# Patient Record
Sex: FEMALE | Race: WHITE
[De-identification: names, ages, dates, MRNs, and addresses within clinical notes are randomized per-mention and may not be internally consistent; named-entity substitution may affect disease eponyms.]

---

## 2020-04-14 ENCOUNTER — HOSPITAL ENCOUNTER (INPATIENT)
Dept: HOSPITAL 1 - ED | Age: 60
LOS: 3 days | Discharge: HOME | DRG: 432 | End: 2020-04-17
Attending: FAMILY MEDICINE | Admitting: FAMILY MEDICINE
Payer: SELF-PAY

## 2020-04-14 VITALS
WEIGHT: 168.12 LBS | HEIGHT: 64 IN | WEIGHT: 168.12 LBS | BODY MASS INDEX: 28.7 KG/M2 | HEIGHT: 64 IN | BODY MASS INDEX: 28.7 KG/M2

## 2020-04-14 VITALS — SYSTOLIC BLOOD PRESSURE: 130 MMHG | DIASTOLIC BLOOD PRESSURE: 75 MMHG

## 2020-04-14 DIAGNOSIS — K70.40: Primary | ICD-10-CM

## 2020-04-14 DIAGNOSIS — K86.1: ICD-10-CM

## 2020-04-14 DIAGNOSIS — N17.0: ICD-10-CM

## 2020-04-14 DIAGNOSIS — E87.6: ICD-10-CM
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
## 2020-04-14 DIAGNOSIS — Z90.49: ICD-10-CM

## 2020-04-14 DIAGNOSIS — E87.1: ICD-10-CM

## 2020-04-14 DIAGNOSIS — F10.20: ICD-10-CM

## 2020-04-14 DIAGNOSIS — N39.0: ICD-10-CM

## 2020-04-14 DIAGNOSIS — K70.30: ICD-10-CM

## 2020-04-14 LAB
ALBUMIN SERPL-MCNC: 1.7 G/DL (ref 3.4–5)
ALP SERPL-CCNC: 158 U/L (ref 46–116)
ALT SERPL-CCNC: 121 U/L (ref 14–59)
AST SERPL-CCNC: 359 U/L (ref 15–37)
BASOPHILS NFR BLD: 0.1 % (ref 0–2)
BILIRUB SERPL-MCNC: 27.4 MG/DL (ref 0.2–1)
BUN SERPL-MCNC: 9 MG/DL (ref 7–18)
CALCIUM SERPL-MCNC: 8.8 MG/DL (ref 8.5–10.1)
CHLORIDE SERPL-SCNC: 96 MMOL/L (ref 98–107)
CO2 SERPL-SCNC: 28.1 MMOL/L (ref 21–32)
CREAT SERPL-MCNC: 1.1 MG/DL (ref 0.6–1)
ERYTHROCYTE [DISTWIDTH] IN BLOOD BY AUTOMATED COUNT: 20 % (ref 11.5–14.5)
GFR SERPLBLD BASED ON 1.73 SQ M-ARVRAT: 54 ML/MIN
GLUCOSE SERPL-MCNC: 135 MG/DL (ref 74–106)
MICROSCOPIC UR-IMP: YES
PLATELET # BLD: 269 X10^3MCL (ref 130–400)
POTASSIUM SERPL-SCNC: 2.9 MMOL/L (ref 3.5–5.1)
PROT SERPL-MCNC: 7.9 G/DL (ref 6.4–8.2)
RBC # UR STRIP.AUTO: NEGATIVE /UL
SODIUM SERPL-SCNC: 133 MMOL/L (ref 136–145)
UA SPECIFIC GRAVITY: 1.01 (ref 1–1.03)

## 2020-04-14 PROCEDURE — G0378 HOSPITAL OBSERVATION PER HR: HCPCS

## 2020-04-14 NOTE — NUR
RECEIVED PT FROM ED VIA GURNEY WEARING A MASK. CAME IN FOR BODY ACHES AND
WEAKNESS X2 MONTHS. AOX4, C/O HA. ABLE TO FOLLOW COMMANDS. NO SOB NOTED. PT
C/O DRY COUGH. DENIES ANY CHEST PAIN, NSR ON THE MONITOR. DENIES ABD
DISCOMFORT. LAST BM 4/13/20. VOIDS FREELY. JAUNDICED. NO EDEMA NOTED. IV SITE
ON RAC PATENT AND INTACT. POTASSIUM CHLORIDE INFUSING AT 30ML/HR. PADDED SIDE
RAILS UP X2. CALL LIGHT WITHIN REACH. BED IN LOWEST POSITION. ENDORSE CARE TO
[place]ITO.

## 2020-04-14 NOTE — NUR
RECEIVED PT FROM PREVIOUS SHIFT NURSE. PT AOX2, NONVERBAL. MED SURG PT, DENIES
CP/PRESSURE. NO S/S SOB/DIFFICULTY BREATHING, ON 2L NC. IV TO RFA, INTACT AND
PATENT. BED IN LOWEST POSITION. CALL LIGHT WITHIN REACH. WILL CONTINUE TO
MONITOR.

## 2020-04-14 NOTE — NUR
PT BIB SELF, C/O BODYACHES AND WEAKNESS X2 MONTHS WITH DRY COUGH X2 MONTHS ON
AND OFF. PT DENIES ANY FEVER, SOB, TRAVELING OR BEING AROUND ANYONE SICK. PT
STATES 2 MONTHS AGO SHE ALSO HAD N/V. PT DENIES ANY N/V AT THIS TIME. PT
ACTIVELY COUGHING. PT WEARING MASK. PT PLACED ON GOWN, CARDIAC MONITOR, PULSE
OX. PT SKIN AND EYES JAUNDICE FROM CHRONIC MEDICAL CONDITION. PT AAOX4, NO
DISTRESS NOTED, RESPIRATIONS E/U, CLEAR UPPER BILATERAL AND LEFT BASE LUNGS 
SOUNDS WITH RIGHT MID/LWER LOBE DIMINISGED BREATH SOUNDS UPON AUSCULTATION.
PT AMBULATORY WITH SLOW STEADY GAIT TO BATHROOM AND BACK TO EXAM ROOM. CALL
LIGHT WITHIN REACH. AWATING FOR MSE. WILL CONTINUE TO  MONITOR.

## 2020-04-14 NOTE — NUR
RECEIVED PT FROM RAJEEV KATZ. PT IN NO ACUTE DISTRESS. CALL LIGHT WITHIN REACH.
BED IN LOWEST POSITION.

## 2020-04-15 VITALS — SYSTOLIC BLOOD PRESSURE: 108 MMHG | DIASTOLIC BLOOD PRESSURE: 78 MMHG

## 2020-04-15 VITALS — DIASTOLIC BLOOD PRESSURE: 56 MMHG | SYSTOLIC BLOOD PRESSURE: 103 MMHG

## 2020-04-15 VITALS — DIASTOLIC BLOOD PRESSURE: 59 MMHG | SYSTOLIC BLOOD PRESSURE: 104 MMHG

## 2020-04-15 VITALS — DIASTOLIC BLOOD PRESSURE: 66 MMHG | SYSTOLIC BLOOD PRESSURE: 116 MMHG

## 2020-04-15 VITALS — DIASTOLIC BLOOD PRESSURE: 66 MMHG | SYSTOLIC BLOOD PRESSURE: 111 MMHG

## 2020-04-15 LAB
ALBUMIN SERPL-MCNC: 1.4 G/DL (ref 3.4–5)
ALP SERPL-CCNC: 136 U/L (ref 46–116)
ALT SERPL-CCNC: 100 U/L (ref 14–59)
AMPHETAMINES UR QL SCN: (no result)
AST SERPL-CCNC: 293 U/L (ref 15–37)
BASOPHILS NFR BLD: 0.5 % (ref 0–2)
BILIRUB SERPL-MCNC: 21.8 MG/DL (ref 0.2–1)
BUN SERPL-MCNC: 8 MG/DL (ref 7–18)
CALCIUM SERPL-MCNC: 8.2 MG/DL (ref 8.5–10.1)
CHLORIDE SERPL-SCNC: 101 MMOL/L (ref 98–107)
CO2 SERPL-SCNC: 24.7 MMOL/L (ref 21–32)
CREAT SERPL-MCNC: 0.9 MG/DL (ref 0.6–1)
ERYTHROCYTE [DISTWIDTH] IN BLOOD BY AUTOMATED COUNT: 19.9 % (ref 11.5–14.5)
GFR SERPLBLD BASED ON 1.73 SQ M-ARVRAT: > 60 ML/MIN
GLUCOSE SERPL-MCNC: 107 MG/DL (ref 74–106)
MAGNESIUM SERPL-MCNC: 2.4 MG/DL (ref 1.8–2.4)
PHOSPHATE SERPL-MCNC: 2.7 MG/DL (ref 2.5–4.9)
PLATELET # BLD: 205 X10^3MCL (ref 130–400)
POTASSIUM SERPL-SCNC: 2.5 MMOL/L (ref 3.5–5.1)
PROT SERPL-MCNC: 7.3 G/DL (ref 6.4–8.2)
SODIUM SERPL-SCNC: 137 MMOL/L (ref 136–145)

## 2020-04-15 NOTE — NUR
RECIEVED PT RESTING IN BED WITH NO DISTRESS NOTED. A/OX4 WITH NO HA OR
DIZZINESS. SEIZURE PRECAUTIONS IN PLACE. JAUNDICE NOTED TO MD MAXIME AWARE.
TELE#2 CONNECTED TO PT, DENIES CP OR PRESSURE. LUNGS CTAB WITH NO SOB NOTED.
INTERMITTENT DRY COUGH REPORTED BY NOC. NS 41.6 ML/HR RUNNING IN RAC, CDI AND
PATENT. SAFETY PRECAUTIONS IN PLACE, CALL LIGHT NOTED WILL MONITOR.

## 2020-04-15 NOTE — NUR
PT RESTING IN BED WITH NO DISTRESS NOTED. A/OX4 WITH NO HA OR DIZZINESS.
SEIZURE PRECAUTIONS IN PLACE. JAUNDICE NOTED TO MD MAXIME AWARE. TELE #2
CONNECTED TO PT, DENIES CP OR PRESSURE. LUNGS CTAB WITH NO SOB NOTED.
D5/NS/KCL-20MEQ RUNNING AT 80ML/HR IN RAC, CDI AND PATENT. SAFETY PRECAUTIONS
IN PLACE, CALL LIGHT WITHIN REACH, WILL ENDORSE CARE TO NOC SHIFT.

## 2020-04-15 NOTE — NUR
PT RESTING IN BED. DENIES ANY PAIN/DISCOMFORT AT THIS TIME. BED IN LOWEST
POSITION. CALL LIGHT WITHIN REACH. WILL CONTINUE TO MONITOR.

## 2020-04-15 NOTE — NUR
PT STABLE WITH NO DISTRESS NOTED. SAFETY PRECAUTIONS IN PLACE, CALL LIGHT
WITHIN REACH, WILL MONITOR.

## 2020-04-15 NOTE — NUR
DR MARTINEZ AT BEDSIDE DISCUSSING TREATMENT AND POC, PT VERBALIZES UNDERSTANDING.
NO NEW ORDERS RECIEVED AT THIS TIME.

## 2020-04-15 NOTE — NUR
RECIEVED CALL FROM PT FAMILY MEMBER NAMED ANITA RODRIGUEZ [phone], SHE STATES
FAMILY HAS DECISION OF PROCEDURE TO NOTIFY MD. PAGED DR SAN WITH
FAMILY CONTACT INFORMATION.

## 2020-04-15 NOTE — NUR
PT RECIEVED FROM DAY NURSE. PT RESTING IN BED AT THIS TIME. DENIES PAIN OR
DISCOMFORT. A/O X4, CALM AND COOPERATIVE AT THIS TIME. TELE 2 NSR. DENIES CP,
NV, DIZZINESS, OR PALPATAIONS. PAPABLE PULSES, ABD DISTENDED AND SOFT, DENIES
PAIN WITH PALPATION. GENERALIZED WEAKNESS. PT ABLE TO TURN AND REPOSITION
SELF. YELLOWING OF SKIN AND SCLERA NOTED. IV IN THE RAC NOTED, CDI AND
INFUSING. BED REST AT 45 DEGREE ANGLE. CALL LIGHT WITHIN REACH. WILL CONTINUE
TO MONITOR.

## 2020-04-15 NOTE — NUR
POTASSIUM LEVEL AT 2.5, DR SAN NOTIFIED AND K-RIDER STARTED. NO C/O
BURNING OR PAIN AT IV SITE, WILL MONITOR.

## 2020-04-16 VITALS — DIASTOLIC BLOOD PRESSURE: 71 MMHG | SYSTOLIC BLOOD PRESSURE: 122 MMHG

## 2020-04-16 VITALS — SYSTOLIC BLOOD PRESSURE: 123 MMHG | DIASTOLIC BLOOD PRESSURE: 69 MMHG

## 2020-04-16 VITALS — DIASTOLIC BLOOD PRESSURE: 68 MMHG | SYSTOLIC BLOOD PRESSURE: 112 MMHG

## 2020-04-16 VITALS — DIASTOLIC BLOOD PRESSURE: 79 MMHG | SYSTOLIC BLOOD PRESSURE: 123 MMHG

## 2020-04-16 VITALS — DIASTOLIC BLOOD PRESSURE: 76 MMHG | SYSTOLIC BLOOD PRESSURE: 124 MMHG

## 2020-04-16 LAB
ALBUMIN SERPL-MCNC: 1.4 G/DL (ref 3.4–5)
ALP SERPL-CCNC: 135 U/L (ref 46–116)
ALT SERPL-CCNC: 97 U/L (ref 14–59)
AST SERPL-CCNC: 275 U/L (ref 15–37)
BASOPHILS NFR BLD: 0.2 % (ref 0–2)
BILIRUB SERPL-MCNC: 21.2 MG/DL (ref 0.2–1)
BUN SERPL-MCNC: 6 MG/DL (ref 7–18)
CALCIUM SERPL-MCNC: 8.1 MG/DL (ref 8.5–10.1)
CHLORIDE SERPL-SCNC: 105 MMOL/L (ref 98–107)
CO2 SERPL-SCNC: 23 MMOL/L (ref 21–32)
CREAT SERPL-MCNC: 0.9 MG/DL (ref 0.6–1)
ERYTHROCYTE [DISTWIDTH] IN BLOOD BY AUTOMATED COUNT: 19.1 % (ref 11.5–14.5)
GFR SERPLBLD BASED ON 1.73 SQ M-ARVRAT: > 60 ML/MIN
GLUCOSE SERPL-MCNC: 143 MG/DL (ref 74–106)
MAGNESIUM SERPL-MCNC: 1.9 MG/DL (ref 1.8–2.4)
PHOSPHATE SERPL-MCNC: 1.8 MG/DL (ref 2.5–4.9)
PLATELET # BLD: 184 X10^3MCL (ref 130–400)
POTASSIUM SERPL-SCNC: 2.9 MMOL/L (ref 3.5–5.1)
PROT SERPL-MCNC: 6.8 G/DL (ref 6.4–8.2)
SODIUM SERPL-SCNC: 139 MMOL/L (ref 136–145)

## 2020-04-16 NOTE — NUR
RECEIVED PATIENTFROM NIGHT NURSE, PATIENT ALERT ORIENTED X 4, LUNG SOUNDS
CLEAR, GENERALIZED WEAKNESS, ABLE TO AMBULATE TO BEDSIDE COMMODE, IV INTACT TO
RAC, TELEMETRY LEADS IN PLACE NS 80, DENIES CHECT PAIN, BOWEL SOUNDS ACTIVE,
SKIN JAUNDICE, VOIDS IN COMMODE WITH SOME EPISODES OF INCONTINENCE, NO EDEMA
NOTED, NO REPORTS OF PAIN

## 2020-04-16 NOTE — NUR
PT RESTING IN BED, DENIES PAIN OR DISCOMFORT, BREATHING E/U ON RMA, NO S/S OF
ACUTE DISTRESS NOTED AT THIS TIME. ALL MEDS AND CONCERNS ADDRESSED. WILL
CONTINUE TO MONITOR.

## 2020-04-16 NOTE — NUR
ASSISTED PATIENT TO COMMODE, PATIENT HAD LARGE WATERY STOOL AAND MODERATE
AMOUNT OF BRIGHT YELLOW URINE

## 2020-04-16 NOTE — NUR
PATIENT LAYING IN BED, REFUSING BREAKFAST AND ALL AM IV AND PO MEDS, REPORTS
SHE DID NOT SLEEP WELL LAST NIGHT AND WOULD LIKE TO BE LEFT ALONE TO REST

## 2020-04-16 NOTE — NUR
Initial Nutrition Assessment: 244T/B SANJEEV BATISTAMELISSAFAHEEM 60F HR IA
 
Consult: End-stage liver diseases
Dx: Jaundice, hypokalemia, Alcohol liver disease
PMHx: Pancreatitis, hepatitis
PSHx: cholecystectomy
Labs: (4/16) Glu 143H, BUN 6.0L Bilirubin 21.2H, AST 275H, ALT 97H, (4/14)
ammonia 51H
Meds: Aldactone, Cephulac, D5%/NS0.9%/KCL2, neutral-phos packet, potassium
chloride, Rocephin, Theragran, vitamin B-1
PRN meds: Robitussin, Zofran
Diet: Regular diet with ensure BID
PO intake since admission: 50-75% x 3 meals with average PO intake of 58%
Ht: 162.54cm/64in Wt: 76.26kg/167lbs BMI: 28.9 Bed scale: Pt sitting on edge
of the bed
IBW: 54kg/120lbs %IBW: 141%  ABW: 60kg UBW: pt could not recall
Age: 60
Food Allergies: NKFA
Edema: None noted
Last BM: 4/16 per pt
Skin: not intact, jaundice on all extremities
Fran: 20
Per H and P (4/14), This is a 60-year-old female with PMH of pancreatitis and
hepatitis in childhood came to ED with c/o weakness, body aches,
forgetfulness, decrease in appetite and confusion for past few months. Pt
noted that her urine is darker in color also. Per daughter who is present at
bedside, the patient was found by her to be very jaundiced. Pt upon
questioning when her jaundice started, she stated that her skin is normal
color. Denies fever, chest pain, recent sick contacts, pruritis.
Pt admitted with dx: AMS possibly to hepatic encephalopathy in the setting of
ETOH cirrhosis, Hyponatremia, ELIZABETH possibly 2/2 vasomotor nephropathy,
Hypokalemia, UTI, DVT
 
RD Note (4/16)
Pt was seen sitting on her bed during visit. Pt appeared to have jaundice on
her face and extremities. Pt reported having loose stool today, and she didn't
have other GI distress. Per pt she had poor appetite and she was trying to eat
more. Pt also stated that she used to take multivitamin. Asked pt if she had
been drinking ensure for extra kcal and energy, and pt asked if she could
drink ensure as a meal replacement. RD encouraged pt to have more PO intake
and explained ensure is a supplement; however, pt should try finishing ensure
if she had low appetite. Additionally, pt stated that she would like to also
try strawberry flavor ensure, RD acknowledged.
 
Problem with:  N/V/D/C: Diarrhea today per pt
Problems with: Chewing:  Swallowing: none per pt
Current appetite: Poor per pt
Recent wt change: 15 lbs wt loss in 2 weeks per pt %wt change: 8% in 2 weeks
per pt
Height:5'4" per pt
Vitamin/Supplement use: used to per pt
Special diet at home: none per pt
Physical activity: used to walk per pt
Nutrition education given (specify specific nutrition education and handout
given): encouraged pt to increase PO intake and drinking nutrition supplement.
 
Food-drug interactions? Education given? n/a
 
Estimated Nutritional Needs Based on adjusted body weight (60kg)
Energy:7546-7781 kcal/day (25-35 kcal/kg for ESLD)
Protein: 72-78 g/day (1.2-1.3g/kg for ESLD)
Fluid: 7946-1751 mL/day (1 mL/kcal)
 
Nutrition Diagnosis:
1. Inadequate energy and protein intake r/t poor PO intake a/e/b average PO
intake of 58% since admission, and pt reported poor appetite.
2. Altered nutrition related labs r/t ESLD a/e/b elevated glucose, bilirubin,
AST, ALT, and ammonia level.
3. Increased vitamins and minerals need r/t impaired nutrition utilization
a/e/b pt has ESLD.
 
Intervention
1. Recommend continue regular diet
2. Recommend continue ensure BID for poor PO intake. It will provide 700kcal
and 40g protein.
3. Recommend continue MVI and vitamin B-1.
 
Monitor/Evaluate
Goal: PO intake at least 75% of estimated needs
Monitor: PO intake, Labs, GI function, Body weight
F/U in 2-3 days as high risk 4/18-19

## 2020-04-16 NOTE — NUR
PT RESTING IN BED AT THIS TIME. DENIES PAIN AND DISCOMFORT, BREATHING E/U, NO
S/S OF ACUTE DISTRESS AT THIS TIME. ALL MEDS AND CONCERNS ADDRESSED THIS
SHIFT. CALL LIGHT WITHIN REACH. WILL ENDORSE TO DAY NURSE.

## 2020-04-16 NOTE — NUR
PATIENT SITTING UP IN BED, NO COMPLAINTS OF PAIN OR DISCOMFORT, K-RIDER STILL
INFUSING, WILL ENDORSE CARE TO NIGHT NURSE

## 2020-04-16 NOTE — NUR
1. Recommend continue regular diet
2. Recommend continue ensure BID for poor PO intake. It will provide 700kcal
and 40g protein.
3. Recommend continue MVI and vitamin B-1.

## 2020-04-17 VITALS — DIASTOLIC BLOOD PRESSURE: 63 MMHG | SYSTOLIC BLOOD PRESSURE: 109 MMHG

## 2020-04-17 VITALS — SYSTOLIC BLOOD PRESSURE: 109 MMHG | DIASTOLIC BLOOD PRESSURE: 67 MMHG

## 2020-04-17 VITALS — DIASTOLIC BLOOD PRESSURE: 55 MMHG | SYSTOLIC BLOOD PRESSURE: 103 MMHG

## 2020-04-17 LAB
ALBUMIN SERPL-MCNC: 1.3 G/DL (ref 3.4–5)
ALP SERPL-CCNC: 131 U/L (ref 46–116)
ALT SERPL-CCNC: 95 U/L (ref 14–59)
AST SERPL-CCNC: 271 U/L (ref 15–37)
BASOPHILS NFR BLD: 0.5 % (ref 0–2)
BILIRUB SERPL-MCNC: 19.2 MG/DL (ref 0.2–1)
BUN SERPL-MCNC: 7 MG/DL (ref 7–18)
CALCIUM SERPL-MCNC: 8 MG/DL (ref 8.5–10.1)
CHLORIDE SERPL-SCNC: 103 MMOL/L (ref 98–107)
CO2 SERPL-SCNC: 23.3 MMOL/L (ref 21–32)
CREAT SERPL-MCNC: 0.9 MG/DL (ref 0.6–1)
ERYTHROCYTE [DISTWIDTH] IN BLOOD BY AUTOMATED COUNT: 19.1 % (ref 11.5–14.5)
GFR SERPLBLD BASED ON 1.73 SQ M-ARVRAT: > 60 ML/MIN
GLUCOSE SERPL-MCNC: 152 MG/DL (ref 74–106)
MAGNESIUM SERPL-MCNC: 1.6 MG/DL (ref 1.8–2.4)
PHOSPHATE SERPL-MCNC: 2.4 MG/DL (ref 2.5–4.9)
PLATELET # BLD: 170 X10^3MCL (ref 130–400)
POTASSIUM SERPL-SCNC: 3.3 MMOL/L (ref 3.5–5.1)
PROT SERPL-MCNC: 6.7 G/DL (ref 6.4–8.2)
SODIUM SERPL-SCNC: 136 MMOL/L (ref 136–145)

## 2020-04-17 NOTE — NUR
PT IS RESTING IN BED WITH BOTH EYES CLOSED. BREATHING EVEN AND UNALBORED. NO
SIGN OF DISTRESS NOTED. NO ACCUTE EVENT OCCURED AT NIGHT. BED IS AT LOWEST
SETTING. CALL LIGHT WITHIN REACH. WILL CONTINUE TO MONTIOR.

## 2020-04-17 NOTE — NUR
PT DISCHARGED HOME. PT IS A/A, ORIENTED X  4, BREATHING EQUAL/UNLABORED ON RA.
NO ACUTE PAIN/DISTRESS. IV REMOVED WITH CATHETER INTACT, SITE WNL. DISCHARGE
INSTRUCTIONS/EDUCATION, F/U APPT, AND NEW RX DISCUSSED WITH PT AND PT
VERBALIZED UNDERSTANDING. INFORMED DR. SAN OF NEW URINE CULTURE
RESULT, AND  STATED SHE WOULD SEND A PO ANTIBIOTIC TO THE PHARMACY, PT IS
AWARE. ALL QUESTIONS/ CONCERNS ADDRESSED. PT BROUGHT DOWN TO LOBBY VIA W/C,
ACCOMPANIED BY CNA. ALL BELONGINGS WITH PT

## 2020-04-17 NOTE — NUR
RECEIVED PT LYING IN BED A/A. BREATHING EQUAL/UNLABORED ON RA. NO ACUTE
PAIN/DISTRESS. IVF INFUSING, SITE WNL. BED IN LOW POSITION, CALL LIGHT IN
REACH, SAFETY PRECAUTIONS IN PLACE. WILL CONTINUE TO MONITOR

## 2020-04-17 NOTE — NUR
PT USED BEDSIDE COMMODE AND WHEN PT WENT BACK TO SIT ON THE BED, PT ACCIDENTLY
SAT ON IV AND IT WAS REMOVED. CATH WAS INTACT. NEW IV WILL BE INSERTED.

## 2020-04-17 NOTE — NUR
PT LYING IN BED A/A. BREATHING EQUAL/UNLABORED ON RA. NO ACUTE
CHANGES/DISTRESS/PAIN. IV SITE WNL. BED IN LOW POSITION, CALL LIGHT IN REACH,
SAFETY PRECAUTIONS IN PLACE. WILL CONTINUE TO MONITOR

## 2020-04-17 NOTE — NUR
PT IS RESTING IN BED. DENIES ANY PAIN OR DISTRESS. ON RA. BED IS AT LOWEST
SETTING. CALL LIGHT WITHIN REACH. WILL CONTINUE TO MONTIOR.

## 2020-07-14 ENCOUNTER — HOSPITAL ENCOUNTER (EMERGENCY)
Dept: HOSPITAL 26 - MED | Age: 60
Discharge: LEFT BEFORE DISCHARGE | End: 2020-07-14
Payer: SELF-PAY

## 2020-07-14 DIAGNOSIS — R10.9: Primary | ICD-10-CM

## 2020-07-14 DIAGNOSIS — Z53.21: ICD-10-CM

## 2023-11-21 NOTE — NUR
Addended by: GIAN RIDLEY on: 11/21/2023 07:37 AM     Modules accepted: Orders     PO LACTOLUS GIVEN PER ORDER.
PT RESTING, CALL LIGHT WITHIN REACH, WILL CONTINUE TO MONITOR. 10-Aug-2018 05:33